# Patient Record
Sex: FEMALE | Race: WHITE | Employment: OTHER | URBAN - METROPOLITAN AREA
[De-identification: names, ages, dates, MRNs, and addresses within clinical notes are randomized per-mention and may not be internally consistent; named-entity substitution may affect disease eponyms.]

---

## 2017-04-10 ENCOUNTER — APPOINTMENT (OUTPATIENT)
Dept: PHYSICAL THERAPY | Age: 67
End: 2017-04-10

## 2017-06-20 ENCOUNTER — HOSPITAL ENCOUNTER (OUTPATIENT)
Dept: PHYSICAL THERAPY | Age: 67
Discharge: HOME | End: 2017-06-20
Payer: MEDICARE

## 2017-06-20 PROCEDURE — G8978 MOBILITY CURRENT STATUS: HCPCS

## 2017-06-20 PROCEDURE — G8979 MOBILITY GOAL STATUS: HCPCS

## 2017-06-20 PROCEDURE — 97162 PT EVAL MOD COMPLEX 30 MIN: CPT

## 2017-06-20 PROCEDURE — 97110 THERAPEUTIC EXERCISES: CPT

## 2017-06-20 NOTE — PROGRESS NOTES
Kenzie Jacobs 29  [facility]  [facility]  [facility], [address] ,[address], [address]  Phone: [phone]  Fax: [phone] / STATEMENT OF MEDICAL NECESSITY FOR PHYSICAL THERAPY SERVICES  Patient Name: Mary Hamilton : 1950   Medical   Diagnosis: History of hip surgery [Z98.890] Treatment Diagnosis: L hip pain  L THR   Onset Date: 2017     Referral Source: Fortino Iglesias MD [facility] [place], operated by [facility]): 2017   Prior Hospitalization: See medical history Provider #: [id number]   Prior Level of Function: Safely ambulate without fear of falling   Comorbidities: Arthritis, Back pain, BMI over 30, Heart disease, Diabetes, High BP, FALL RISK   Medications: Verified on Patient Summary List   The Plan of Care and following information is based on the information from the initial evaluation.   ===========================================================================================  Assessment / key information: Patient is a 74 y.o. female who presents to [facility] s/p L THR on 2017. Patient displayed signs of infection 4 weeks after surgery followed by complications due to medications resulting in a diagnosis of sepsis. Patient reports pain with L S/L and R trunk rot. The patient rates L hip pain as 10/10 at worst, 0/10 at best, and 0/10 currently. Objective PT examination findings include (1) L hip abd 3+/5 MMT (2) L hip flexor/quad/glut functional weakness (3) palpable tenderness along incision (4) unsteadiness with sit-stand and gait with SPC on R (5) unable to assume and maintain B SLS. A home exercise program was demonstrated and provided to address the above objective and functional deficits. Patient can benefit from skilled PT interventions to improve gross L hip strength, decrease tenderness, to facilitate performance of ADLs & improve overall functional status. ===========================================================================================  Eval Complexity: History HIGH Complexity :3+ comorbidities / personal factors will impact the outcome/ POC ;  Examination  MEDIUM Complexity : 3 Standardized tests and measures addressing body structure, function, activity limitation and / or participation in recreation ; Presentation MEDIUM Complexity : Evolving with changing characteristics ; Decision Making MEDIUM Complexity : FOTO score of 26-74; Overall Complexity MEDIUM  Problem List: pain affecting function, decrease strength, impaired gait/ balance, decrease ADL/ functional abilitiies, decrease activity tolerance and decrease transfer abilities, FOTO score 43  Treatment Plan may include any combination of the following: Therapeutic exercise, Therapeutic activities, Neuromuscular re-education, Physical agent/modality, Gait/balance training, Manual therapy including dry needling, Aquatic therapy and Patient education  Patient / Family readiness to learn indicated by: asking questions, trying to perform skills and interest  Persons(s) to be included in education: patient (P)  Barriers to Learning/Limitations: no  Measures taken:    Patient Goal (s): \"To be able to walk steadier\"   Patient self reported health status: poor  Rehabilitation Potential: good   Short Term Goals: To be accomplished in  1-2  weeks:  1) Patient to be independent and compliant with initial HEP to prevent further disability. 2) Improve FOTO score to 45 indicating significant functional improvement. 3) Patient will maintain SLS for 6-8 seconds for safety with curb negotiation. 4) Patient will improve L hip gross strength to 4+/5 to perform sit-stand with ease.  Long Term Goals:  To be accomplished in  3-4  weeks:  1) Patient to be independent & compliant with progressive HEP in preparation for D/C.  2) Improve FOTO score to 47 indicating significant functional improvement in order to return to [facility]. 3) Patient will ambulate community distances with [facility] without safety concerns. Frequency / Duration:   Patient to be seen  2-3  times per week for 3-4  weeks:  Patient / Caregiver education and instruction: self care, activity modification and exercises  G-Codes (GP): Mobility [id number] Current  CK= 40-59%   Goal  CK= 40-59%. The severity rating is based on the FOTO Score    Therapist Signature: Mita Galan PT, DPT, MTC, CMTPT Date: 5/40/4962   Certification Period: 6/20/17 to 9/18/17 Time: 3:17 PM   ===========================================================================================  I certify that the above Physical Therapy Services are being furnished while the patient is under my care. I agree with the treatment plan and certify that this therapy is necessary. Physician Signature:        Date:       Time:     Please sign and return to [facility] or you may fax the signed copy to [phone]. Thank you.

## 2017-06-20 NOTE — PROGRESS NOTES
PHYSICAL THERAPY - DAILY TREATMENT NOTE    Patient Name: Risa Rivera        Date: 2017  : 1950   YES Patient  Verified  Visit #:     Insurance: Payor: Morgan  / Plan: [place] MEDICARE PART B / Product Type: Medicare /      In time: 210 Out time: 310   Total Treatment Time: 60     Medicare Time Tracking (below)   Total Timed Codes (min):  60 1:1 Treatment Time:  60     TREATMENT AREA =  History of hip surgery [Z98.890]  SUBJECTIVE  Pain Level (on 0 to 10 scale):  0  / 10   Medication Changes/New allergies or changes in medical history, any new surgeries or procedures?     NO    If yes, update Summary List   Subjective Functional Status/Changes:  []  No changes reported     See POC          OBJECTIVE  Modalities Rationale:     decrease inflammation, decrease pain and increase tissue extensibility to improve patient's ability to perform functional ADLs   min [] Estim, type/location:                                      []  att     []  unatt     []  w/US     []  w/ice    []  w/heat    min []  Mechanical Traction: type/lbs                   []  pro   []  sup   []  int   []  cont    []  before manual    []  after manual    min []  Ultrasound, settings/location:      min []  Iontophoresis w/ dexamethasone, location:                                               []  take home patch       []  in clinic    min []  Ice     []  Heat    location/position:     min []  Vasopneumatic Device, press/temp:     min []  Other:    [] Skin assessment post-treatment (if applicable):    []  intact    []  redness- no adverse reaction     []redness  adverse reaction:        15 min Therapeutic Exercise:  [x]  See flow sheet   Rationale:      increase ROM and increase strength to improve the patients ability to regain full functional mobility of L hip for stairs and curb negotiation      min Manual Therapy:    Rationale:      decrease pain, increase ROM, increase tissue extensibility and decrease trigger points to improve the patient's ability to regain full functional mobility     min Therapeutic Activity:    Rationale:    increase strength, improve coordination and increase proprioception to improve the patients ability to      min Neuromuscular Re-ed:    Rationale:    improve coordination, improve balance and increase proprioception to improve the patients ability to      min Gait Training:    Rationale:       min Patient Education:  YES  Reviewed HEP   [x]  Progressed/Changed HEP based on:   Issued written HEP and reviewed     Other Objective/Functional Measures:    See POC  TE per FS  Reviewed hip precautions with patient to prevent potential dislocation. Post Treatment Pain Level (on 0 to 10) scale:   0  / 10     ASSESSMENT  Assessment/Changes in Function:     Progressed treatment as appropriate with good patient tolerance     []  See Progress Note/Recertification   Patient will continue to benefit from skilled PT services to modify and progress therapeutic interventions, address functional mobility deficits, address ROM deficits, address strength deficits, analyze and address soft tissue restrictions, analyze and cue movement patterns, analyze and modify body mechanics/ergonomics and assess and modify postural abnormalities to attain remaining goals.    Progress toward goals / Updated goals:    Progressing towards goals established in POC     PLAN  [x]  Upgrade activities as tolerated YES Continue plan of care   []  Discharge due to :    []  Other:      Therapist: Luann Darden, PT, DPT, MTC, CMTPT  Holly Mt, SPT    Date: 6/20/2017 Time: 3:18 PM     Future Appointments  Date Time Provider Derick Mckeon   6/23/2017 10:30 AM Neto Rodriguez PT [facility]   6/26/2017 10:00 AM Neto Rodriguez PT [facility]   6/28/2017 12:30 PM Neto Rodriguez PT [facility]   7/3/2017 10:30 AM Neto Rodriguez PT [facility]   7/6/2017 10:30 AM Neto Rodriguez PT [facility]   7/10/2017 2:00 PM Dania Coombs, PT [facility] 7/13/2017 10:00 AM Dania Shaver, PT [facility]   7/17/2017 10:00 AM Dania Shaver, PT [facility]   7/19/2017 10:00 AM Dania Shaver, PT [facility]   7/24/2017 10:00 AM Adeola Turk, PT [facility]   9/12/2017 11:15 AM [facility] Laura [facility]   9/12/2017 11:30 AM Albin Saavedra MD [address] [facility]

## 2017-06-23 ENCOUNTER — HOSPITAL ENCOUNTER (OUTPATIENT)
Dept: PHYSICAL THERAPY | Age: 67
Discharge: HOME | End: 2017-06-23
Payer: MEDICARE

## 2017-06-23 PROCEDURE — 97110 THERAPEUTIC EXERCISES: CPT

## 2017-06-23 PROCEDURE — 97112 NEUROMUSCULAR REEDUCATION: CPT

## 2017-06-23 NOTE — PROGRESS NOTES
PHYSICAL THERAPY - DAILY TREATMENT NOTE    Patient Name: Mary Sanchez        Date: 2017  : 1950   YES Patient  Verified  Visit #:     Insurance: Payor: Mariangel Evans / Plan: [place] MEDICARE PART B / Product Type: Medicare /      In time: 9432 Out time: 1130   Total Treatment Time: 50     Medicare Time Tracking (below)   Total Timed Codes (min):  50 1:1 Treatment Time:  40     TREATMENT AREA =  Left hip pain [M25.552]  History of hip surgery [Z98.890]  SUBJECTIVE  Pain Level (on 0 to 10 scale):  1  / 10   Medication Changes/New allergies or changes in medical history, any new surgeries or procedures? NO    If yes, update Summary List   Subjective Functional Status/Changes:  []  No changes reported     Patient reports her  has been massaging her incision at night. She still has pain along the incision when laying on L side.           OBJECTIVE  Modalities Rationale:     decrease inflammation, decrease pain and increase tissue extensibility to improve patient's ability to perform functional ADLs   min [] Estim, type/location:                                      []  att     []  unatt     []  w/US     []  w/ice    []  w/heat    min []  Mechanical Traction: type/lbs                   []  pro   []  sup   []  int   []  cont    []  before manual    []  after manual    min []  Ultrasound, settings/location:      min []  Iontophoresis w/ dexamethasone, location:                                               []  take home patch       []  in clinic   @ home min []  Ice     []  Heat    location/position:     min []  Vasopneumatic Device, press/temp:     min []  Other:    [] Skin assessment post-treatment (if applicable):    []  intact    []  redness- no adverse reaction     []redness  adverse reaction:        25/15 min Therapeutic Exercise:  [x]  See flow sheet   Rationale:      increase ROM and increase strength to improve the patients ability to regain functional mobility of L hip for walking and stairs/curb negotiation     min Manual Therapy:    Rationale:      decrease pain, increase ROM, increase tissue extensibility and decrease trigger points to improve the patient's ability to regain full functional mobility     min Therapeutic Activity:    Rationale:    increase strength, improve coordination and increase proprioception to improve the patients ability to     25/25 min Neuromuscular Re-ed: Static balance exercises with EO/EC, dynamic balance exercises with EO/EC without SPC   Rationale:    improve coordination, improve balance and increase proprioception to improve the patients ability to walk without safety concerns with/without SPC     min Gait Training:    Rationale:       min Patient Education:  YES  Reviewed HEP   []  Progressed/Changed HEP based on: Other Objective/Functional Measures:    TE per FS. Challenged patient's static/dynamic balance. Post Treatment Pain Level (on 0 to 10) scale:   1  / 10     ASSESSMENT  Assessment/Changes in Function:     Progressed treatment as appropriate with good patient tolerance. Patient required seated rest breaks in between balance interventions due to SOB. []  See Progress Note/Recertification   Patient will continue to benefit from skilled PT services to modify and progress therapeutic interventions, address functional mobility deficits, address ROM deficits, address strength deficits, analyze and address soft tissue restrictions, analyze and cue movement patterns, analyze and modify body mechanics/ergonomics and assess and modify postural abnormalities to attain remaining goals. Progress toward goals / Updated goals:    Progressing towards STG 3 and 4.      PLAN  [x]  Upgrade activities as tolerated YES Continue plan of care   []  Discharge due to :    []  Other:      Therapist: Evens Blackman, PT, DPT, MTC, CMTPT  LESLIE Ley    Date: 6/23/2017 Time: 11:50 AM     Future Appointments  Date Time Provider Derick Mckeon 6/26/2017 10:00 AM Dania Sadler, PT [facility]   6/28/2017 12:30 PM [address] [place] [address], [facility]   7/3/2017 10:30 AM Dania Sadler, PT [facility]   7/6/2017 10:30 AM [address] [place] [address], [facility]   7/10/2017 2:00 PM [address] [place] [address], [facility]   7/13/2017 10:00 AM Dania Sadler, PT [facility]   7/17/2017 10:00 AM Dania Sadler, PT [facility]   7/19/2017 10:00 AM [address] [place] [address], [facility]   7/24/2017 10:00 AM [address] [place] [address], [facility]   9/12/2017 11:15 AM [facility]   9/12/2017 11:30 AM Pierce Ross MD [address] [facility]

## 2017-06-26 ENCOUNTER — HOSPITAL ENCOUNTER (OUTPATIENT)
Dept: PHYSICAL THERAPY | Age: 67
Discharge: HOME | End: 2017-06-26
Payer: MEDICARE

## 2017-06-26 PROCEDURE — 97110 THERAPEUTIC EXERCISES: CPT

## 2017-06-26 PROCEDURE — 97112 NEUROMUSCULAR REEDUCATION: CPT

## 2017-06-26 NOTE — PROGRESS NOTES
PHYSICAL THERAPY - DAILY TREATMENT NOTE    Patient Name: Rogerio Beckwith        Date: 2017  : 1950   YES Patient  Verified  Visit #:   3   of   12  Insurance: Payor: Suraj Romance / Plan: [place] MEDICARE PART B / Product Type: Medicare /      In time: 1000 Out time: 1055   Total Treatment Time: 50     Medicare Time Tracking (below)   Total Timed Codes (min):  50 1:1 Treatment Time:  25     TREATMENT AREA =  Left hip pain [M25.552]  History of hip surgery [Z98.890]  SUBJECTIVE  Pain Level (on 0 to 10 scale):  1  / 10   Medication Changes/New allergies or changes in medical history, any new surgeries or procedures? NO    If yes, update Summary List   Subjective Functional Status/Changes:  []  No changes reported     Patient reports that her  continues to massage her incision and that it is still painful. She has not tried to lay on the L, but will try at home to see if she can tolerate it.          OBJECTIVE  Modalities Rationale:     decrease inflammation, decrease pain and increase tissue extensibility to improve patient's ability to perform functional ADLs   min [] Estim, type/location:                                      []  att     []  unatt     []  w/US     []  w/ice    []  w/heat    min []  Mechanical Traction: type/lbs                   []  pro   []  sup   []  int   []  cont    []  before manual    []  after manual    min []  Ultrasound, settings/location:      min []  Iontophoresis w/ dexamethasone, location:                                               []  take home patch       []  in clinic    min []  Ice     []  Heat    location/position:     min []  Vasopneumatic Device, press/temp:     min []  Other:    [] Skin assessment post-treatment (if applicable):    []  intact    []  redness- no adverse reaction     []redness  adverse reaction:        25/10 min Therapeutic Exercise:  [x]  See flow sheet   Rationale:      increase ROM and increase strength to improve the patients ability to regain functional mobility of L hip for walking and curb negotiation     min Manual Therapy:    Rationale:      decrease pain, increase ROM, increase tissue extensibility and decrease trigger points to improve the patient's ability to regain full functional mobility     min Therapeutic Activity:    Rationale:    increase strength, improve coordination and increase proprioception to improve the patients ability to     25/15 min Neuromuscular Re-ed: Dynamic balance exercises with EO without SPC   Rationale:    improve coordination, improve balance and increase proprioception to improve the patients ability to walk without safety concerns with/without SPC     min Gait Training:    Rationale:       min Patient Education:  YES  Reviewed HEP   []  Progressed/Changed HEP based on: Other Objective/Functional Measures:    Progressed TE per FS. Patient required VC for glut activation and upright head position during SLS. Challenged patient with dynamic balance interventions without SPC. Post Treatment Pain Level (on 0 to 10) scale:   1 / 10     ASSESSMENT  Assessment/Changes in Function:     Progressed treatment as appropriate with good patient tolerance. Patient continues to require frequent seated rest breaks during standing activities due to SOB. []  See Progress Note/Recertification   Patient will continue to benefit from skilled PT services to modify and progress therapeutic interventions, address functional mobility deficits, address ROM deficits, address strength deficits, analyze and address soft tissue restrictions, analyze and cue movement patterns, analyze and modify body mechanics/ergonomics and assess and modify postural abnormalities to attain remaining goals. Progress toward goals / Updated goals:    Progressing towards STG 3 and 4.      PLAN  [x]  Upgrade activities as tolerated YES Continue plan of care   []  Discharge due to :    []  Other:       Therapist: Dylan Jenkins, PT, DPT, MTC, CMTPT  Becca Tse, SPT    Date: 6/26/2017 Time: 1:04 PM     Future Appointments  Date Time Provider Derick Egani   6/28/2017 12:30 PM Jadon Puri, PT [facility]   7/3/2017 10:30 AM Jadon Puri, PT [facility]   7/6/2017 10:30 AM Jadon Puri, PT [facility]   7/10/2017 2:00 PM Jadon Puri, PT [facility]   7/13/2017 10:00 AM Jadon Puri, PT [facility]   7/17/2017 10:00 AM Jadon Puri, PT [facility]   7/19/2017 10:00 AM Jadon Puri, PT [facility]   7/24/2017 10:00 AM Jadon Puri, PT [facility]   9/12/2017 11:15 AM [facility] Pamela RODRIGUEZ   9/12/2017 11:30 AM North Guillermo MD [address]

## 2017-06-28 ENCOUNTER — APPOINTMENT (OUTPATIENT)
Dept: PHYSICAL THERAPY | Age: 67
End: 2017-06-28
Payer: MEDICARE

## 2017-07-03 ENCOUNTER — HOSPITAL ENCOUNTER (OUTPATIENT)
Dept: PHYSICAL THERAPY | Age: 67
Discharge: HOME | End: 2017-07-03
Payer: MEDICARE

## 2017-07-03 PROCEDURE — 97110 THERAPEUTIC EXERCISES: CPT

## 2017-07-03 PROCEDURE — 97140 MANUAL THERAPY 1/> REGIONS: CPT

## 2017-07-03 NOTE — PROGRESS NOTES
PHYSICAL THERAPY - DAILY TREATMENT NOTE    Patient Name: Alexandra Berumen        Date: 7/3/2017  : 1950   YES Patient  Verified  Visit #:     Insurance: Payor: Bart Lobo / Plan: [place] MEDICARE PART B / Product Type: Medicare /      In time: 8469 Out time: 1130   Total Treatment Time: 50     Medicare Time Tracking (below)   Total Timed Codes (min):  50 1:1 Treatment Time:  40     TREATMENT AREA =  Left hip pain [M25.552]  History of hip surgery [Z98.890]  SUBJECTIVE  Pain Level (on 0 to 10 scale):  5  / 10   Medication Changes/New allergies or changes in medical history, any new surgeries or procedures? NO    If yes, update Summary List   Subjective Functional Status/Changes:  []  No changes reported     Im still not feeling well and my whole body feels weak.  My hip is not red or hot to the touch but Padma feel more short of breath       OBJECTIVE  Modalities Rationale:     decrease inflammation, decrease pain and increase tissue extensibility to improve patient's ability to perform functional ADLs   min [] Estim, type/location:                                      []  att     []  unatt     []  w/US     []  w/ice    []  w/heat    min []  Mechanical Traction: type/lbs                   []  pro   []  sup   []  int   []  cont    []  before manual    []  after manual    min []  Ultrasound, settings/location:      min []  Iontophoresis w/ dexamethasone, location:                                               []  take home patch       []  in clinic    min []  Ice     []  Heat    location/position:     min []  Vasopneumatic Device, press/temp:     min []  Other:    [] Skin assessment post-treatment (if applicable):    []  intact    []  redness- no adverse reaction     []redness  adverse reaction:        40/30 min Therapeutic Exercise:  [x]  See flow sheet   Rationale:      increase ROM and increase strength to improve the patients ability to regain functional mobility of L hip for walking and curb negotiation    10 min Manual Therapy: L passive psoas and quad stretch in R S/L   Rationale:      decrease pain, increase ROM, increase tissue extensibility and decrease trigger points to improve the patient's ability to regain full functional mobility     min Therapeutic Activity:    Rationale:    increase strength, improve coordination and increase proprioception to improve the patients ability to      min Neuromuscular Re-ed:    Rationale:    improve coordination, improve balance and increase proprioception to improve the patients ability to walk without safety concerns with/without SPC     min Gait Training:    Rationale:       min Patient Education:  YES  Reviewed HEP   []  Progressed/Changed HEP based on: Other Objective/Functional Measures:    Pt reports no stretch felt during trial of standing psoas stretch  No signs of infection but recommended pt monitor SX and contact MD if persist/worsen    Quick to fatigue with exercises today and requested to hold standing TE     Post Treatment Pain Level (on 0 to 10) scale:   4  / 10     ASSESSMENT  Assessment/Changes in Function:     Progressed treatment as appropriate     []  See Progress Note/Recertification   Patient will continue to benefit from skilled PT services to modify and progress therapeutic interventions, address functional mobility deficits, address ROM deficits, address strength deficits, analyze and address soft tissue restrictions, analyze and cue movement patterns, analyze and modify body mechanics/ergonomics and assess and modify postural abnormalities to attain remaining goals. Progress toward goals / Updated goals:    Progressing towards STG 3 and 4.      PLAN  [x]  Upgrade activities as tolerated YES Continue plan of care   []  Discharge due to :    []  Other:       Therapist: Ольга Haque, PT, DPT, MTC, CMTPT    Date: 7/3/2017 Time: 1:30 PM     Future Appointments  Date Time Provider Derick Mckeon   7/6/2017 10:30 AM Dania SHEA Anmol Nielson [place] [address]   7/10/2017 2:00 PM Brad Cano, PT [place] [address]   7/13/2017 10:00 AM Brad Cano, PT [place] [address]   7/17/2017 10:00 AM Brad Cano, PT [place] [address]   7/19/2017 10:00 AM Brad Cano, PT [place] [address]   7/24/2017 10:00 AM Brad Cano, PT [place] [address]   9/12/2017 11:15 AM [facility]   9/12/2017 11:30 AM Alexandra Badillo MD [address] [facility]

## 2017-07-06 ENCOUNTER — APPOINTMENT (OUTPATIENT)
Dept: PHYSICAL THERAPY | Age: 67
End: 2017-07-06
Payer: MEDICARE

## 2017-07-10 ENCOUNTER — APPOINTMENT (OUTPATIENT)
Dept: PHYSICAL THERAPY | Age: 67
End: 2017-07-10
Payer: MEDICARE

## 2017-07-13 ENCOUNTER — APPOINTMENT (OUTPATIENT)
Dept: PHYSICAL THERAPY | Age: 67
End: 2017-07-13
Payer: MEDICARE

## 2017-07-17 ENCOUNTER — APPOINTMENT (OUTPATIENT)
Dept: PHYSICAL THERAPY | Age: 67
End: 2017-07-17
Payer: MEDICARE

## 2017-07-19 ENCOUNTER — APPOINTMENT (OUTPATIENT)
Dept: PHYSICAL THERAPY | Age: 67
End: 2017-07-19
Payer: MEDICARE

## 2017-07-24 ENCOUNTER — APPOINTMENT (OUTPATIENT)
Dept: PHYSICAL THERAPY | Age: 67
End: 2017-07-24
Payer: MEDICARE

## 2017-10-04 NOTE — PROGRESS NOTES
Kenzie Romoejskisimone Jacobs 29  [facility]  [facility] [place] [facility], [address] ,[address], [address]  Phone: [phone]  Fax: [phone] FOR PHYSICAL THERAPY          Patient Name: Corinne Pleasure : 1950   Treatment/Medical Diagnosis: Left hip pain [M25.552]  History of hip surgery [Z98.890]   Onset Date: 17    Referral Source: Boris Rodriguez MD [facility]): 17   Prior Hospitalization: See Medical History Provider #: [id number]   Prior Level of Function: Safely ambulate without fear of falling   Comorbidities: Arthritis, Back pain, BMI over 30, Heart disease, Diabetes, High BP, FALL RISK   Medications: Verified on Patient Summary List   Visits from [facility]: 4 Missed Visits: 2     Goal/Measure of Progress Goal Met? 1. Patient to be independent and compliant with initial HEP to prevent further disability. Status at last Eval: established Current Status: I with HEP yes   2. Improve FOTO score to 45 indicating significant functional improvement. Status at last Eval: 37 Current Status: Unable to be reassessed n/a   3.     Status at last Eval: unable to assume and maintain B SLS Current Status: Unable to be reassessed n/a     Key Functional Changes/Progress: Patient is a 74 y.o. female who presents to [facility] on 2017. Mrs. Kaykay Simms did not return to PT after her 4th visit due to illness and increasing c/o overall weakness and shortness of breath. Current status is unknown. G-Codes (GP): NA  Assessments/Recommendations: Discontinue therapy due to lack of attendance or compliance. If you have any questions/comments please contact us directly at [phone]. Thank you for allowing us to assist in the care of your patient.     Therapist Signature: Юлия Emery, PT, DPT, MTC, CMTPT Date: 10/4/2017   Reporting Period: 17 to 7/3/17 Time: 1:52 PM

## 2018-03-15 PROBLEM — E66.01 OBESITY, MORBID (HCC): Status: ACTIVE | Noted: 2018-03-15
